# Patient Record
Sex: MALE | Race: WHITE | NOT HISPANIC OR LATINO | Employment: FULL TIME | URBAN - NONMETROPOLITAN AREA
[De-identification: names, ages, dates, MRNs, and addresses within clinical notes are randomized per-mention and may not be internally consistent; named-entity substitution may affect disease eponyms.]

---

## 2018-07-12 ENCOUNTER — TRANSCRIBE ORDERS (OUTPATIENT)
Dept: ADMINISTRATIVE | Facility: HOSPITAL | Age: 33
End: 2018-07-12

## 2018-07-12 ENCOUNTER — HOSPITAL ENCOUNTER (OUTPATIENT)
Dept: GENERAL RADIOLOGY | Facility: HOSPITAL | Age: 33
Discharge: HOME | End: 2018-07-12
Attending: FAMILY MEDICINE

## 2018-07-12 DIAGNOSIS — Z02.1 ENCOUNTER FOR PRE-EMPLOYMENT HEALTH SCREENING EXAMINATION: Primary | ICD-10-CM

## 2018-07-12 DIAGNOSIS — Z02.1 ENCOUNTER FOR PRE-EMPLOYMENT HEALTH SCREENING EXAMINATION: ICD-10-CM

## 2018-07-12 PROCEDURE — 71045 X-RAY EXAM CHEST 1 VIEW: CPT

## 2021-01-14 PROCEDURE — U0004 COV-19 TEST NON-CDC HGH THRU: HCPCS | Performed by: NURSE PRACTITIONER

## 2023-06-05 ENCOUNTER — HOSPITAL ENCOUNTER (OUTPATIENT)
Dept: GENERAL RADIOLOGY | Facility: HOSPITAL | Age: 38
Discharge: HOME | End: 2023-06-05
Admitting: FAMILY MEDICINE
Payer: OTHER GOVERNMENT

## 2023-06-05 PROCEDURE — 87635 SARS-COV-2 COVID-19 AMP PRB: CPT | Performed by: FAMILY MEDICINE

## 2023-06-05 PROCEDURE — 71046 X-RAY EXAM CHEST 2 VIEWS: CPT

## 2025-07-01 ENCOUNTER — TELEPHONE (OUTPATIENT)
Age: 40
End: 2025-07-01

## 2025-07-01 NOTE — TELEPHONE ENCOUNTER
Enrique Iniguez  1985  [facility] [place] [facility] 6/29  Rt Hand FX  DOI 6/28  Splint  Yes Xrays Pt Has Disc  [facility]   [phone]

## 2025-07-02 ENCOUNTER — OFFICE VISIT (OUTPATIENT)
Age: 40
End: 2025-07-02
Payer: OTHER GOVERNMENT

## 2025-07-02 VITALS — HEIGHT: 71 IN | BODY MASS INDEX: 26.88 KG/M2 | WEIGHT: 192 LBS

## 2025-07-02 DIAGNOSIS — M79.641 RIGHT HAND PAIN: Primary | ICD-10-CM

## 2025-07-02 PROCEDURE — 99203 OFFICE O/P NEW LOW 30 MIN: CPT

## 2025-07-02 RX ORDER — HYDROCODONE BITARTRATE AND ACETAMINOPHEN 5; 325 MG/1; MG/1
1 TABLET ORAL EVERY 6 HOURS PRN
COMMUNITY
Start: 2025-06-30

## 2025-07-02 NOTE — PROGRESS NOTES
Casting Notes:    Type of cast/splint:Heat Molded Ulnar Gutter Splint     Material Used:  1. NONE     2.      3.    Fiberglass Cast Tape: SIZE Medium Heat Molded Ulnar Gutter     Reason for Application:     ICD-10-CM    1. Right hand pain  M79.641       Patient was placed in a Medium Short Arm Heat Molded Ulnar Gutter Splint. There was no complications.     Patient was given cast care instructions, and told to call the office with any complaints, or concerns.     Patient was also told to keep their routine follow up appointment.    Salazar Schwartz MA    
removed.      Radiology:   No results found.     Assessment:   Encounter Diagnosis   Name Primary?    Right hand pain Yes        Plan:  Review of external provided radiographic images of the right hand does demonstrate a nondisplaced fracture to the neck of the fifth metacarpal.  There is associated tissue swelling noted around the fracture.  All other joint spaces are appropriate.    Patient will have heat molded ulnar gutter splint applied.  Patient will need to leave this splint on for the next 2 weeks after which she may remove the splint 3-4 times a day to work on range of motion or to perform daily hygiene tasks such as showering or bathing.  Patient is aware that he will need to return to the clinic in 3 weeks to have repeat x-rays with the splint removed.  Patient to start taking Tylenol as needed for pain control and to hold on taking hydrocodone only taking when necessary.  New Prescriptions    No medications on file       Return in about 3 weeks (around 7/23/2025) for follow-up XOC.             Electronically signed by ALECIA Moe CNP on 7/2/2025 at 2:48 PM